# Patient Record
Sex: FEMALE | Race: WHITE | NOT HISPANIC OR LATINO | URBAN - METROPOLITAN AREA
[De-identification: names, ages, dates, MRNs, and addresses within clinical notes are randomized per-mention and may not be internally consistent; named-entity substitution may affect disease eponyms.]

---

## 2023-01-20 ENCOUNTER — OFFICE VISIT (OUTPATIENT)
Dept: URGENT CARE | Facility: CLINIC | Age: 68
End: 2023-01-20

## 2023-01-20 VITALS
WEIGHT: 212 LBS | RESPIRATION RATE: 18 BRPM | SYSTOLIC BLOOD PRESSURE: 162 MMHG | DIASTOLIC BLOOD PRESSURE: 80 MMHG | HEART RATE: 68 BPM | TEMPERATURE: 97.4 F | OXYGEN SATURATION: 96 % | BODY MASS INDEX: 39.01 KG/M2 | HEIGHT: 62 IN

## 2023-01-20 DIAGNOSIS — J40 BRONCHITIS: Primary | ICD-10-CM

## 2023-01-20 RX ORDER — AZITHROMYCIN 250 MG/1
TABLET, FILM COATED ORAL
Qty: 6 TABLET | Refills: 0 | Status: SHIPPED | OUTPATIENT
Start: 2023-01-20 | End: 2023-01-24

## 2023-01-20 RX ORDER — PREDNISONE 10 MG/1
10 TABLET ORAL DAILY
Qty: 21 TABLET | Refills: 0 | Status: SHIPPED | OUTPATIENT
Start: 2023-01-20

## 2023-01-20 NOTE — PROGRESS NOTES
[address] [facility]        NAME: Gabriel Harden is a 69 y o  female  : 1955    MRN: [id number]  DATE: 2023  TIME: 11:12 AM    Assessment and Plan   Bronchitis [J40]  1  Bronchitis  predniSONE 10 mg tablet    azithromycin (ZITHROMAX) 250 mg tablet            Patient Instructions   Medications as prescribed  Flonase  Zyrtec  Follow up with PCP in 3-5 days  Proceed to  ER if symptoms worsen  Chief Complaint     Chief Complaint   Patient presents with   • Cold Like Symptoms     Cough for approx 1 mo  Now cough improving but still has mucous and losing voice  Home covid test negative end of December  History of Present Illness       Patient is a 20-year-old female with no significant past medical history presents the office planing of cough for 1 month  Patient reports she has difficulty expressing the congestion in her chest   Ports she has been coughing so much that she is now losing her voice  She denies fever, chills, sore throat, congestion, SOB, CP, nausea, vomiting, abdominal pain or rashes  Ports cough is improving but lingering  States her daughter moved in a month ago with her cat and dog and is unsure if she has allergies  Review of Systems   Review of Systems   Constitutional: Negative for chills and fever  HENT: Positive for voice change  Negative for congestion and sore throat  Respiratory: Positive for cough  Negative for shortness of breath  Cardiovascular: Negative for chest pain and palpitations  Gastrointestinal: Negative for abdominal pain, diarrhea, nausea and vomiting  Musculoskeletal: Negative for myalgias  Neurological: Negative for dizziness, light-headedness and headaches           Current Medications       Current Outpatient Medications:   •  azithromycin (ZITHROMAX) 250 mg tablet, Take 2 tablets today then 1 tablet daily x 4 days, Disp: 6 tablet, Rfl: 0  •  predniSONE 10 mg tablet, Take 1 tablet (10 mg total) by mouth daily Take 6 on day 1, take 5 on day 2, take 4 on day 3, take 3 on day 4, take 2 on day 5, take 1 on day 6 , Disp: 21 tablet, Rfl: 0    Current Allergies     Allergies as of 01/20/2023   • (No Known Allergies)            The following portions of the patient's history were reviewed and updated as appropriate: allergies, current medications, past family history, past medical history, past social history, past surgical history and problem list      Past Medical History:   Diagnosis Date   • Known health problems: none        Past Surgical History:   Procedure Laterality Date   • HYSTERECTOMY Bilateral        Family History   Problem Relation Age of Onset   • Cancer Mother    • Heart disease Mother    • Diabetes Mother    • Heart disease Father    • Cancer Sister    • Cancer Brother          Medications have been verified  Objective   /80   Pulse 68   Temp (!) 97 4 °F (36 3 °C)   Resp 18   Ht 5' 2" (1 575 m)   Wt 96 2 kg (212 lb)   SpO2 96%   BMI 38 78 kg/m²   No LMP recorded  Patient is postmenopausal        Physical Exam     Physical Exam  Vitals and nursing note reviewed  Constitutional:       Appearance: Normal appearance  She is well-developed  HENT:      Head: Normocephalic and atraumatic  Right Ear: Tympanic membrane, ear canal and external ear normal       Left Ear: Tympanic membrane, ear canal and external ear normal       Nose: Nose normal       Mouth/Throat:      Pharynx: Uvula midline  Comments: Raspy voice  Eyes:      General: Lids are normal       Conjunctiva/sclera: Conjunctivae normal       Pupils: Pupils are equal, round, and reactive to light  Cardiovascular:      Rate and Rhythm: Normal rate and regular rhythm  Pulses: Normal pulses  Heart sounds: Normal heart sounds  No murmur heard  No friction rub  No gallop  Pulmonary:      Effort: Pulmonary effort is normal       Breath sounds: Normal breath sounds  No wheezing, rhonchi or rales     Musculoskeletal: General: Normal range of motion  Cervical back: Neck supple  Lymphadenopathy:      Cervical: No cervical adenopathy  Skin:     General: Skin is warm and dry  Capillary Refill: Capillary refill takes less than 2 seconds  Neurological:      Mental Status: She is alert